# Patient Record
Sex: FEMALE | URBAN - METROPOLITAN AREA
[De-identification: names, ages, dates, MRNs, and addresses within clinical notes are randomized per-mention and may not be internally consistent; named-entity substitution may affect disease eponyms.]

---

## 2017-04-07 PROBLEM — N95.2 ATROPHIC VAGINITIS: Status: ACTIVE | Noted: 2017-04-07

## 2017-04-07 PROBLEM — M81.0 OSTEOPOROSIS: Status: ACTIVE | Noted: 2017-04-07

## 2018-12-07 ENCOUNTER — APPOINTMENT (RX ONLY)
Dept: URBAN - METROPOLITAN AREA CLINIC 35 | Facility: CLINIC | Age: 58
Setting detail: DERMATOLOGY
End: 2018-12-07

## 2018-12-07 DIAGNOSIS — Z41.9 ENCOUNTER FOR PROCEDURE FOR PURPOSES OTHER THAN REMEDYING HEALTH STATE, UNSPECIFIED: ICD-10-CM

## 2018-12-07 PROCEDURE — ? KYBELLA INJECTION

## 2018-12-07 NOTE — PROCEDURE: KYBELLA INJECTION
Number Of Grid Dots (Won't Render If 0): 22
Detail Level: Detailed
Procedure Text: The treatment areas were cleansed. Kybella was administered using the parameters mentioned above.
Expiration Date (Month Year): 5/19
Treatment Number (Won't Render If 0): 1
Topical Anesthesia?: 23% lidocaine, 7% tetracaine
Lot #: [id number]
Treatment Area: Submental Chin
Anesthesia Volume In Cc: 0.5
Packages Used (Won't Render If 0 - Required If Using Inventory): 2
Price (Use Numbers Only, No Special Characters Or $): 1200.00
Post-Care Instructions: Patient instructed to apply ice to reduce swelling.
Kybella Volume In Cc (Won't Render If 0): 4.4
Consent: Written consent obtained. Risks include but not limited to bruising, beading, irregular texture, ulceration, infection, allergic reaction, scar formation, incomplete augmentation, temporary nature, procedural pain.

## 2019-01-11 ENCOUNTER — APPOINTMENT (RX ONLY)
Dept: URBAN - METROPOLITAN AREA CLINIC 35 | Facility: CLINIC | Age: 59
Setting detail: DERMATOLOGY
End: 2019-01-11

## 2019-01-11 DIAGNOSIS — Z41.9 ENCOUNTER FOR PROCEDURE FOR PURPOSES OTHER THAN REMEDYING HEALTH STATE, UNSPECIFIED: ICD-10-CM

## 2019-01-11 PROCEDURE — ? BOTOX

## 2019-01-11 PROCEDURE — ? ADDITIONAL NOTES

## 2019-01-11 NOTE — PROCEDURE: BOTOX
Glabellar Complex Units: 0
Additional Area 2 Location: Crows Feet
Additional Area 3 Location: Frontalis
Additional Area 5 Location: perioral
Additional Area 6 Location: platysma
Dilution (U/0.1 Cc): 5
Post-Care Instructions: Patient instructed to not lie down for 4 hours after injections and limit physical activity for 24 hours.
Additional Area 1 Location: Glabella
Detail Level: Detailed
Consent: Verbal and written informed consent were obtained to include the following risks: pain, swelling, bruising, eyelid or eyebrow droop, and lack of visible improvement of wrinkles in the areas treated.  The skin was cleansed with alcohol. Injections were administered with a 32g needle into the following areas:
Additional Area 4 Location: Bunny lines

## 2019-02-01 ENCOUNTER — APPOINTMENT (RX ONLY)
Dept: URBAN - METROPOLITAN AREA CLINIC 35 | Facility: CLINIC | Age: 59
Setting detail: DERMATOLOGY
End: 2019-02-01

## 2019-02-01 DIAGNOSIS — Z41.9 ENCOUNTER FOR PROCEDURE FOR PURPOSES OTHER THAN REMEDYING HEALTH STATE, UNSPECIFIED: ICD-10-CM

## 2019-02-01 PROCEDURE — ? COSMETIC FOLLOW-UP

## 2019-02-01 NOTE — PROCEDURE: COSMETIC FOLLOW-UP
Detail Level: Zone
Comments (Free Text): Patient complains of numbness in the injection site, straining to laugh, and no longer being able to whistle. As well as not smiling the same and unable to open her jaw as far. Symptoms are worse on the right side. She said they have been getting less severe with time. Explained she probably had irritation to the marginal mandibular nerve. No treatment needed. Patient will notify me if they get worse. Advised only having 1 vial/treatment and waiting until her symptoms have completely resolved. Photos taken today, improvement along the jaw and submental fullness noted.
Patient Satisfaction: Pleased
Global Improvement: Very Good

## 2019-02-01 NOTE — HPI: COSMETIC FOLLOW UP
How Did You Tolerate The Procedure?: other
What Condition Are We Treating?: submental fullness
What Procedure Did We Perform At The Last Visit?: Kybella
What Was The Problem (Use Complete Sentences)?: Patient complains of numbness in the injection site, straining to laugh, and no longer being able to whistle. As well as not smiling the same and unable to open her jaw as far.

## 2019-04-23 ENCOUNTER — APPOINTMENT (RX ONLY)
Dept: URBAN - METROPOLITAN AREA CLINIC 35 | Facility: CLINIC | Age: 59
Setting detail: DERMATOLOGY
End: 2019-04-23

## 2019-04-23 DIAGNOSIS — L81.4 OTHER MELANIN HYPERPIGMENTATION: ICD-10-CM

## 2019-04-23 DIAGNOSIS — Z41.9 ENCOUNTER FOR PROCEDURE FOR PURPOSES OTHER THAN REMEDYING HEALTH STATE, UNSPECIFIED: ICD-10-CM

## 2019-04-23 PROBLEM — Z85.3 PERSONAL HISTORY OF MALIGNANT NEOPLASM OF BREAST: Status: ACTIVE | Noted: 2019-04-23

## 2019-04-23 PROCEDURE — ? BOTOX

## 2019-04-23 PROCEDURE — ? COUNSELING

## 2019-04-23 PROCEDURE — 99212 OFFICE O/P EST SF 10 MIN: CPT

## 2019-04-23 PROCEDURE — ? ADDITIONAL NOTES

## 2019-04-23 ASSESSMENT — LOCATION DETAILED DESCRIPTION DERM
LOCATION DETAILED: RIGHT CENTRAL MALAR CHEEK
LOCATION DETAILED: RIGHT MEDIAL MALAR CHEEK
LOCATION DETAILED: LEFT SUPERIOR CENTRAL MALAR CHEEK
LOCATION DETAILED: RIGHT SUPERIOR PREAURICULAR CHEEK
LOCATION DETAILED: LEFT CENTRAL MALAR CHEEK

## 2019-04-23 ASSESSMENT — LOCATION ZONE DERM: LOCATION ZONE: FACE

## 2019-04-23 ASSESSMENT — LOCATION SIMPLE DESCRIPTION DERM
LOCATION SIMPLE: LEFT CHEEK
LOCATION SIMPLE: RIGHT CHEEK

## 2019-04-23 NOTE — PROCEDURE: BOTOX
Price (Use Numbers Only, No Special Characters Or $): 600.00
Depressor Anguli Oris Units: 0
Additional Area 5 Location: perioral
Additional Area 4 Location: Bunny lines
Lot #: [id number]
Additional Area 2 Location: Crows Feet
Consent: Verbal and written informed consent were obtained to include the following risks: pain, swelling, bruising, eyelid or eyebrow droop, and lack of visible improvement of wrinkles in the areas treated.  The skin was cleansed with alcohol. Injections were administered with a 32g needle into the following areas:
Additional Area 6 Location: platysma
Detail Level: Zone
Dilution (U/0.1 Cc): 5
Expiration Date (Month Year): 1/21
Additional Area 2 Units: 24
Additional Area 1 Location: Glabella
Additional Area 3 Location: Frontalis
Additional Area 1 Units: 26
Post-Care Instructions: Patient instructed to not lie down for 4 hours after injections and limit physical activity for 24 hours.

## 2019-04-23 NOTE — HPI: SKIN LESION
Is This A New Presentation, Or A Follow-Up?: Skin Lesions
What Type Of Note Output Would You Prefer (Optional)?: Standard Output
How Severe Is Your Skin Lesion?: moderate
Has Your Skin Lesion Been Treated?: not been treated
Additional History: Here for tx with Saadia after lesions examined and confirmed benign

## 2019-04-23 NOTE — PROCEDURE: COUNSELING
Patient Specific Counseling (Will Not Stick From Patient To Patient): Pt here for tx with Saadia SERRANO
Detail Level: Zone

## 2020-07-01 PROBLEM — K59.09 CHRONIC CONSTIPATION: Status: ACTIVE | Noted: 2020-01-13

## 2020-08-06 PROBLEM — M51.369 DDD (DEGENERATIVE DISC DISEASE), LUMBAR: Status: ACTIVE | Noted: 2020-08-06

## 2020-08-06 PROBLEM — M51.36 DDD (DEGENERATIVE DISC DISEASE), LUMBAR: Status: ACTIVE | Noted: 2020-08-06

## 2020-08-06 PROBLEM — M79.604 RIGHT LEG PAIN: Status: ACTIVE | Noted: 2020-08-06

## 2020-08-06 PROBLEM — M25.559 HIP PAIN: Status: ACTIVE | Noted: 2020-08-06

## 2020-08-20 PROBLEM — G89.29 CHRONIC BILATERAL LOW BACK PAIN WITHOUT SCIATICA: Status: ACTIVE | Noted: 2020-08-20

## 2020-08-20 PROBLEM — M54.50 CHRONIC BILATERAL LOW BACK PAIN WITHOUT SCIATICA: Status: ACTIVE | Noted: 2020-08-20

## 2020-09-17 PROBLEM — M47.816 LUMBAR SPONDYLOSIS: Status: ACTIVE | Noted: 2020-09-17

## 2020-10-29 PROBLEM — M48.061 SPINAL STENOSIS OF LUMBAR REGION WITHOUT NEUROGENIC CLAUDICATION: Status: ACTIVE | Noted: 2020-10-29

## 2022-06-10 PROBLEM — G44.40 REBOUND HEADACHE: Status: ACTIVE | Noted: 2022-06-10

## 2022-11-24 PROBLEM — J10.1 INFLUENZA A: Status: ACTIVE | Noted: 2022-11-24

## 2023-12-08 PROBLEM — R91.1 PULMONARY NODULE: Status: ACTIVE | Noted: 2023-12-08

## 2023-12-20 PROBLEM — R91.8 MULTIPLE PULMONARY NODULES DETERMINED BY COMPUTED TOMOGRAPHY OF LUNG: Chronic | Status: ACTIVE | Noted: 2023-12-20

## 2023-12-20 PROBLEM — R91.8 MULTIPLE PULMONARY NODULES DETERMINED BY COMPUTED TOMOGRAPHY OF LUNG: Status: ACTIVE | Noted: 2023-12-20

## 2024-04-01 ENCOUNTER — PATIENT MESSAGE (OUTPATIENT)
Dept: HEALTH INFORMATION MANAGEMENT | Facility: OTHER | Age: 64
End: 2024-04-01

## 2024-06-07 PROBLEM — N95.2 ATROPHIC VAGINITIS: Chronic | Status: ACTIVE | Noted: 2017-04-07

## 2024-06-07 PROBLEM — J10.1 INFLUENZA A: Status: RESOLVED | Noted: 2022-11-24 | Resolved: 2024-06-07

## 2024-06-07 PROBLEM — M79.604 RIGHT LEG PAIN: Status: RESOLVED | Noted: 2020-08-06 | Resolved: 2024-06-07

## 2024-06-07 PROBLEM — G44.40 REBOUND HEADACHE: Status: RESOLVED | Noted: 2022-06-10 | Resolved: 2024-06-07

## 2024-06-07 PROBLEM — K59.09 CHRONIC CONSTIPATION: Chronic | Status: ACTIVE | Noted: 2020-01-13

## 2024-06-07 PROBLEM — M81.0 OSTEOPOROSIS: Chronic | Status: ACTIVE | Noted: 2017-04-07

## 2025-01-31 ENCOUNTER — OFFICE VISIT (OUTPATIENT)
Dept: URGENT CARE | Facility: CLINIC | Age: 65
End: 2025-01-31
Payer: MEDICARE

## 2025-01-31 VITALS
SYSTOLIC BLOOD PRESSURE: 124 MMHG | RESPIRATION RATE: 18 BRPM | HEART RATE: 82 BPM | OXYGEN SATURATION: 97 % | TEMPERATURE: 98.3 F | DIASTOLIC BLOOD PRESSURE: 68 MMHG | WEIGHT: 130 LBS | HEIGHT: 61 IN | BODY MASS INDEX: 24.55 KG/M2

## 2025-01-31 DIAGNOSIS — R05.1 ACUTE COUGH: ICD-10-CM

## 2025-01-31 DIAGNOSIS — J02.9 SORE THROAT: ICD-10-CM

## 2025-01-31 LAB — S PYO DNA SPEC NAA+PROBE: NOT DETECTED

## 2025-01-31 PROCEDURE — 87651 STREP A DNA AMP PROBE: CPT

## 2025-01-31 PROCEDURE — 3074F SYST BP LT 130 MM HG: CPT

## 2025-01-31 PROCEDURE — 3078F DIAST BP <80 MM HG: CPT

## 2025-01-31 PROCEDURE — 99213 OFFICE O/P EST LOW 20 MIN: CPT

## 2025-01-31 RX ORDER — BENZONATATE 100 MG/1
100 CAPSULE ORAL 3 TIMES DAILY PRN
Qty: 30 CAPSULE | Refills: 0 | Status: SHIPPED | OUTPATIENT
Start: 2025-01-31

## 2025-01-31 ASSESSMENT — ENCOUNTER SYMPTOMS
MYALGIAS: 0
VOMITING: 0
FEVER: 0
SHORTNESS OF BREATH: 0
CHILLS: 0
NAUSEA: 0
ABDOMINAL PAIN: 0
WHEEZING: 0
PALPITATIONS: 0
SINUS PAIN: 0
DIARRHEA: 0

## 2025-01-31 ASSESSMENT — FIBROSIS 4 INDEX: FIB4 SCORE: 1.85

## 2025-02-01 ASSESSMENT — ENCOUNTER SYMPTOMS
DIZZINESS: 0
ORTHOPNEA: 0
EYE PAIN: 0

## 2025-02-01 NOTE — PROGRESS NOTES
"Subjective:     Verito Taylor is a 64 y.o. female who presents for Sore Throat (Painful to swallow/eat, sneezing, watery eyes, cough x 2 weeks )    This is a very pleasant patient presenting with runny nose, sneezing, dry cough, sore \"dry\" throat x2 weeks. Denies fevers/chills or SOB. She states symptoms are somewhat improving overall, however the coughing and sore \"dry\" throat has been lingering. She is requesting strep testing.     Review of Systems   Constitutional:  Negative for chills and fever.   HENT:  Negative for ear pain and sinus pain.    Respiratory:  Negative for shortness of breath and wheezing.    Cardiovascular:  Negative for chest pain and palpitations.   Gastrointestinal:  Negative for abdominal pain, diarrhea, nausea and vomiting.   Musculoskeletal:  Negative for myalgias.   All other systems reviewed and are negative.    Medications:    NYQUIL PO    Allergies:  Patient has no known allergies.    Past Social Hx:  Verito Taylor  reports that she has never smoked. She has never been exposed to tobacco smoke. She has never used smokeless tobacco. She reports current alcohol use of about 0.5 oz of alcohol per week. She reports that she does not use drugs.    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     Objective:     /68 (BP Location: Right arm, Patient Position: Sitting, BP Cuff Size: Adult)   Pulse 82   Temp 36.8 °C (98.3 °F) (Temporal)   Resp 18   Ht 1.549 m (5' 1\")   Wt 59 kg (130 lb)   LMP 04/25/1999 (Approximate)   SpO2 97%   BMI 24.56 kg/m²     Physical Exam  Vitals reviewed.   Constitutional:       Appearance: Normal appearance.   HENT:      Head: Normocephalic and " atraumatic.      Right Ear: External ear normal.      Left Ear: External ear normal.      Nose: Nose normal.      Mouth/Throat:      Mouth: Mucous membranes are moist.      Pharynx: No oropharyngeal exudate or posterior oropharyngeal erythema.   Eyes:      Conjunctiva/sclera: Conjunctivae normal.   Cardiovascular:      Rate and Rhythm: Normal rate.      Heart sounds: Normal heart sounds.   Pulmonary:      Effort: Pulmonary effort is normal.      Breath sounds: Normal breath sounds.   Skin:     General: Skin is warm and dry.      Capillary Refill: Capillary refill takes less than 2 seconds.   Neurological:      Mental Status: She is alert and oriented to person, place, and time.       Lab Results   Component Value Date/Time    POCASTPCR Not Detected 01/31/2025 05:45 PM      Assessment/Plan:     Assessment & Plan  Acute cough    Orders:    benzonatate (TESSALON) 100 MG Cap; Take 1 Capsule by mouth 3 times a day as needed for Cough.    Sore throat    Orders:    POCT CEPHEID GROUP A STREP - PCR     - Shared-decision to hold viral testing at this time given symptom duration, symptom presentation, vital signs and physical exam as it would not change the treatment strategy.    - VSS. Physical exam benign. Recommend observation to see if symptoms will resolve spontaneously. Advised supportive care and to treat symptoms with medications, may use over-the-counter meds for symptoms as needed. Encouraged rest, hydration, and humidifier.   - Follow-up with PCP in 2-3 days    Discussed management options (risks, benefits, and alternatives to treatment). Pt expresses understanding and the treatment plan was agreed upon.     Differential diagnosis, natural history, supportive care, and indications for immediate follow-up discussed. Advised the patient to follow-up with PCP for recheck, reevaluation, and consideration of further management. Instructed to go to the nearest Emergency Department or call 911 if symptoms fail to improve,  for any change in condition, further concerns, or new concerning symptoms. Patient states understanding of the plan of care and discharge instructions.    Deances MICHAEL Paz, APRN, FNP-BC